# Patient Record
Sex: FEMALE | Race: WHITE | Employment: OTHER | URBAN - METROPOLITAN AREA
[De-identification: names, ages, dates, MRNs, and addresses within clinical notes are randomized per-mention and may not be internally consistent; named-entity substitution may affect disease eponyms.]

---

## 2020-10-28 ENCOUNTER — LAB ENCOUNTER (OUTPATIENT)
Dept: LAB | Age: 65
End: 2020-10-28
Attending: INTERNAL MEDICINE
Payer: MEDICARE

## 2020-10-28 ENCOUNTER — OFFICE VISIT (OUTPATIENT)
Dept: ENDOCRINOLOGY CLINIC | Facility: CLINIC | Age: 65
End: 2020-10-28
Payer: MEDICARE

## 2020-10-28 VITALS — DIASTOLIC BLOOD PRESSURE: 80 MMHG | HEART RATE: 79 BPM | WEIGHT: 193 LBS | SYSTOLIC BLOOD PRESSURE: 124 MMHG

## 2020-10-28 DIAGNOSIS — E55.9 VITAMIN D DEFICIENCY: ICD-10-CM

## 2020-10-28 DIAGNOSIS — E04.1 THYROID NODULE: ICD-10-CM

## 2020-10-28 DIAGNOSIS — E04.1 THYROID NODULE: Primary | ICD-10-CM

## 2020-10-28 PROCEDURE — 82306 VITAMIN D 25 HYDROXY: CPT

## 2020-10-28 PROCEDURE — 84445 ASSAY OF TSI GLOBULIN: CPT

## 2020-10-28 PROCEDURE — 86800 THYROGLOBULIN ANTIBODY: CPT

## 2020-10-28 PROCEDURE — 86376 MICROSOMAL ANTIBODY EACH: CPT

## 2020-10-28 PROCEDURE — 84432 ASSAY OF THYROGLOBULIN: CPT

## 2020-10-28 PROCEDURE — 84443 ASSAY THYROID STIM HORMONE: CPT

## 2020-10-28 PROCEDURE — 99203 OFFICE O/P NEW LOW 30 MIN: CPT | Performed by: INTERNAL MEDICINE

## 2020-10-28 PROCEDURE — 84439 ASSAY OF FREE THYROXINE: CPT

## 2020-10-28 PROCEDURE — 36415 COLL VENOUS BLD VENIPUNCTURE: CPT

## 2020-10-28 PROCEDURE — G0463 HOSPITAL OUTPT CLINIC VISIT: HCPCS | Performed by: INTERNAL MEDICINE

## 2020-10-28 RX ORDER — PREDNISONE 10 MG/1
10 TABLET ORAL
COMMUNITY
Start: 2020-10-13

## 2020-10-28 NOTE — PROGRESS NOTES
Name: Sonny Carpenter  Date: 10/28/2020    Referring Physician: No ref.  provider found    Patient presents with:  Consult: Pt was diagnosed with  Leukocytoclastic vasculitis and was advised by dermatologist to f/u with Endocrinology to complete thyroid rel Oral Tab, Take 20 mg by mouth nightly., Disp: , Rfl:   •  Azelaic Acid (FINACEA) 15 % External Gel, Use bid as directed to face (Patient not taking: Reported on 10/28/2020 ), Disp: 50 g, Rfl: 6     Allergies:     Penicillins             ANAPHYLAXIS  [place] population approx 50-60% of the population  -Discussed risk of malignancy is approx 3-5%, 95-97% of nodules are benign  -Discussed recommendation to perform FNA biopsy of nodules greater than 1cm in size  -Discussed that if nodule is benign then plan to fo

## 2020-11-02 ENCOUNTER — TELEPHONE (OUTPATIENT)
Dept: ENDOCRINOLOGY CLINIC | Facility: CLINIC | Age: 65
End: 2020-11-02

## 2020-11-02 DIAGNOSIS — E55.9 VITAMIN D DEFICIENCY: Primary | ICD-10-CM

## 2020-11-02 RX ORDER — ERGOCALCIFEROL (VITAMIN D2) 1250 MCG
CAPSULE ORAL
Qty: 12 CAPSULE | Refills: 1 | Status: SHIPPED | OUTPATIENT
Start: 2020-11-02 | End: 2021-01-20

## 2020-11-02 NOTE — TELEPHONE ENCOUNTER
rn called patient with message by dr Phill Cottrell patient verbalized understanding and [facility]quest [facility]  Lab ordered

## 2020-11-02 NOTE — TELEPHONE ENCOUNTER
Please call patient - good news, her thyroid antibody levels were all negative. No evidence of autoimmune thyroid disease. She does have significant vitamin D deficiency.   Start Ergocalciferol 50,000 units weekly for 3 months then recheck Vitamin D level

## 2020-11-11 ENCOUNTER — TELEPHONE (OUTPATIENT)
Dept: ENDOCRINOLOGY CLINIC | Facility: CLINIC | Age: 65
End: 2020-11-11

## 2020-11-11 NOTE — TELEPHONE ENCOUNTER
Received US thyroid from [address] [place] [address] - [facility] on provider's desk for review

## 2020-11-12 NOTE — TELEPHONE ENCOUNTER
rn called patient with message by dr Cornelious Halsted , patient verbalized understanding and had no further questions

## 2020-11-12 NOTE — TELEPHONE ENCOUNTER
Please call patient - good news, thyroid ultrasound is stable. Small nodule has not changed therefore no need for further evaluation at this time. Thanks.

## 2020-12-08 ENCOUNTER — PATIENT MESSAGE (OUTPATIENT)
Dept: ENDOCRINOLOGY CLINIC | Facility: CLINIC | Age: 65
End: 2020-12-08

## 2020-12-08 DIAGNOSIS — E55.9 VITAMIN D DEFICIENCY: Primary | ICD-10-CM

## 2020-12-08 DIAGNOSIS — E04.1 THYROID NODULE: ICD-10-CM

## 2020-12-10 NOTE — TELEPHONE ENCOUNTER
From: Barrera Han  To: Jesse Barros MD  Sent: 12/8/2020 12:58 PM CST  Subject: Other    Hi Dr Ana Travis  I am sending you my bone density test. It says maybe I should be taking some medication for my Osteopenia? I am sending you my report if you could review and let me know what you think.    thank you Noe Verduzco

## 2020-12-10 NOTE — TELEPHONE ENCOUNTER
Dr. Koffi Trejo,     Please see patient's msg below. Bone density test attached to [address] [place] [address]. Thank you!

## 2021-01-20 RX ORDER — ERGOCALCIFEROL 1.25 MG/1
CAPSULE ORAL
Qty: 12 CAPSULE | Refills: 1 | Status: SHIPPED | OUTPATIENT
Start: 2021-01-20 | End: 2021-08-17

## 2021-08-09 RX ORDER — ERGOCALCIFEROL 1.25 MG/1
CAPSULE ORAL
Qty: 12 CAPSULE | Refills: 1 | OUTPATIENT
Start: 2021-08-09

## 2021-08-09 NOTE — TELEPHONE ENCOUNTER
RN sent patient [address] requesting her to have vit D lab drawn to assess level before refilling med per below.     Refused med per provider (below)

## 2021-08-09 NOTE — TELEPHONE ENCOUNTER
Will hold off on refill at this time. Last vitamin d level was drawn on 10/2020. Patient needs to have this lab repeated to evaluate need for continued prescription. Looks like Dr. Gayathri Romero has already entered an order for vitamin d level to be drawn.  Ple

## 2022-08-25 NOTE — TELEPHONE ENCOUNTER
lov 10/28/20  Has fu on 12/12/22  Hi Dr Melia Singh  I never did go for my vitamin D blood work last year. I had back surgery and then totally forgot about it. Could I have a script to get my  vitamin D & the complete thyroid panel blood work like I did before?  I made an appointment but you are too good so I could get in until Dec. lol I am on the wait list.

## 2022-09-09 ENCOUNTER — LAB ENCOUNTER (OUTPATIENT)
Dept: LAB | Age: 67
End: 2022-09-09
Attending: INTERNAL MEDICINE
Payer: MEDICARE

## 2022-09-09 DIAGNOSIS — E04.1 THYROID NODULE: ICD-10-CM

## 2022-09-09 LAB
T4 FREE SERPL-MCNC: 1.1 NG/DL (ref 0.8–1.7)
TSI SER-ACNC: 0.62 MIU/ML (ref 0.36–3.74)
VIT D+METAB SERPL-MCNC: 23.6 NG/ML (ref 30–100)

## 2022-09-09 PROCEDURE — 84443 ASSAY THYROID STIM HORMONE: CPT

## 2022-09-09 PROCEDURE — 36415 COLL VENOUS BLD VENIPUNCTURE: CPT

## 2022-09-09 PROCEDURE — 84439 ASSAY OF FREE THYROXINE: CPT

## 2022-09-09 PROCEDURE — 82306 VITAMIN D 25 HYDROXY: CPT | Performed by: INTERNAL MEDICINE

## 2022-12-05 RX ORDER — ERGOCALCIFEROL 1.25 MG/1
CAPSULE ORAL
Qty: 12 CAPSULE | Refills: 1 | Status: SHIPPED | OUTPATIENT
Start: 2022-12-05

## 2022-12-12 ENCOUNTER — OFFICE VISIT (OUTPATIENT)
Dept: ENDOCRINOLOGY CLINIC | Facility: CLINIC | Age: 67
End: 2022-12-12
Payer: MEDICARE

## 2022-12-12 VITALS — WEIGHT: 207 LBS | HEART RATE: 66 BPM | DIASTOLIC BLOOD PRESSURE: 80 MMHG | SYSTOLIC BLOOD PRESSURE: 130 MMHG

## 2022-12-12 DIAGNOSIS — E03.8 HYPOTHYROIDISM DUE TO HASHIMOTO'S THYROIDITIS: Primary | ICD-10-CM

## 2022-12-12 DIAGNOSIS — E55.9 VITAMIN D DEFICIENCY: ICD-10-CM

## 2022-12-12 DIAGNOSIS — E06.3 HYPOTHYROIDISM DUE TO HASHIMOTO'S THYROIDITIS: Primary | ICD-10-CM

## 2022-12-12 PROCEDURE — 99214 OFFICE O/P EST MOD 30 MIN: CPT | Performed by: INTERNAL MEDICINE

## 2022-12-12 RX ORDER — LEVOTHYROXINE SODIUM 0.1 MG/1
TABLET ORAL
Qty: 90 TABLET | Refills: 0 | Status: SHIPPED | OUTPATIENT
Start: 2022-12-12

## 2022-12-12 RX ORDER — PHENTERMINE HYDROCHLORIDE 15 MG/1
15 CAPSULE ORAL EVERY MORNING
Qty: 30 CAPSULE | Refills: 1 | Status: SHIPPED | OUTPATIENT
Start: 2022-12-12

## 2023-01-05 ENCOUNTER — PATIENT MESSAGE (OUTPATIENT)
Dept: ENDOCRINOLOGY CLINIC | Facility: CLINIC | Age: 68
End: 2023-01-05

## 2023-01-06 RX ORDER — PHENTERMINE HYDROCHLORIDE 30 MG/1
30 CAPSULE ORAL EVERY MORNING
Qty: 30 CAPSULE | Refills: 2 | Status: SHIPPED | OUTPATIENT
Start: 2023-01-06

## 2023-04-18 NOTE — TELEPHONE ENCOUNTER
Phentermine HCl 30 MG Oral Cap, Take 1 capsule (30 mg total) by mouth every morning., Disp: 30 capsule, Rfl: 2      Pharmacy Message: The current prescription for your patient has no refills remaining or is about to .  If you want to continue therapy for your patient, as indicated above, please prepare and fax a valid signed written prescription to the pharmacy, or authorize a verbal prescription directly to the pharmacist.

## 2023-04-20 RX ORDER — PHENTERMINE HYDROCHLORIDE 30 MG/1
30 CAPSULE ORAL EVERY MORNING
Qty: 30 CAPSULE | Refills: 2 | Status: SHIPPED | OUTPATIENT
Start: 2023-04-20

## 2023-07-26 ENCOUNTER — PATIENT MESSAGE (OUTPATIENT)
Dept: ENDOCRINOLOGY CLINIC | Facility: CLINIC | Age: 68
End: 2023-07-26

## 2023-07-26 DIAGNOSIS — E04.1 THYROID NODULE: ICD-10-CM

## 2023-07-26 DIAGNOSIS — E88.81 METABOLIC SYNDROME: ICD-10-CM

## 2023-07-26 DIAGNOSIS — R73.01 IMPAIRED FASTING GLUCOSE: ICD-10-CM

## 2023-07-26 DIAGNOSIS — E55.9 VITAMIN D DEFICIENCY: ICD-10-CM

## 2023-07-26 DIAGNOSIS — E03.8 HYPOTHYROIDISM DUE TO HASHIMOTO'S THYROIDITIS: Primary | ICD-10-CM

## 2023-07-26 DIAGNOSIS — E06.3 HYPOTHYROIDISM DUE TO HASHIMOTO'S THYROIDITIS: Primary | ICD-10-CM

## 2023-08-01 ENCOUNTER — LAB ENCOUNTER (OUTPATIENT)
Dept: LAB | Age: 68
End: 2023-08-01
Attending: INTERNAL MEDICINE
Payer: MEDICARE

## 2023-08-01 DIAGNOSIS — E88.81 METABOLIC SYNDROME: ICD-10-CM

## 2023-08-01 DIAGNOSIS — E55.9 VITAMIN D DEFICIENCY: ICD-10-CM

## 2023-08-01 DIAGNOSIS — E03.8 HYPOTHYROIDISM DUE TO HASHIMOTO'S THYROIDITIS: ICD-10-CM

## 2023-08-01 DIAGNOSIS — E06.3 HYPOTHYROIDISM DUE TO HASHIMOTO'S THYROIDITIS: ICD-10-CM

## 2023-08-01 DIAGNOSIS — R73.01 IMPAIRED FASTING GLUCOSE: ICD-10-CM

## 2023-08-01 LAB
ALBUMIN SERPL-MCNC: 3.9 G/DL (ref 3.4–5)
ALBUMIN/GLOB SERPL: 1.2 {RATIO} (ref 1–2)
ALP LIVER SERPL-CCNC: 88 U/L
ALT SERPL-CCNC: 36 U/L
ANION GAP SERPL CALC-SCNC: 4 MMOL/L (ref 0–18)
AST SERPL-CCNC: 17 U/L (ref 15–37)
BILIRUB SERPL-MCNC: 0.5 MG/DL (ref 0.1–2)
BUN BLD-MCNC: 14 MG/DL (ref 7–18)
CALCIUM BLD-MCNC: 9.1 MG/DL (ref 8.5–10.1)
CHLORIDE SERPL-SCNC: 107 MMOL/L (ref 98–112)
CO2 SERPL-SCNC: 30 MMOL/L (ref 21–32)
CREAT BLD-MCNC: 0.93 MG/DL
EGFRCR SERPLBLD CKD-EPI 2021: 67 ML/MIN/1.73M2 (ref 60–?)
FASTING STATUS PATIENT QL REPORTED: NO
GLOBULIN PLAS-MCNC: 3.3 G/DL (ref 2.8–4.4)
GLUCOSE BLD-MCNC: 142 MG/DL (ref 70–99)
OSMOLALITY SERPL CALC.SUM OF ELEC: 295 MOSM/KG (ref 275–295)
POTASSIUM SERPL-SCNC: 3.5 MMOL/L (ref 3.5–5.1)
PROT SERPL-MCNC: 7.2 G/DL (ref 6.4–8.2)
SODIUM SERPL-SCNC: 141 MMOL/L (ref 136–145)
T3FREE SERPL-MCNC: 2.28 PG/ML (ref 2.4–4.2)
T4 FREE SERPL-MCNC: 1.1 NG/DL (ref 0.8–1.7)
TSI SER-ACNC: 1.19 MIU/ML (ref 0.36–3.74)
VIT D+METAB SERPL-MCNC: 68.9 NG/ML (ref 30–100)

## 2023-08-01 PROCEDURE — 84481 FREE ASSAY (FT-3): CPT

## 2023-08-01 PROCEDURE — 84443 ASSAY THYROID STIM HORMONE: CPT

## 2023-08-01 PROCEDURE — 84439 ASSAY OF FREE THYROXINE: CPT

## 2023-08-01 PROCEDURE — 36415 COLL VENOUS BLD VENIPUNCTURE: CPT

## 2023-08-01 PROCEDURE — 82306 VITAMIN D 25 HYDROXY: CPT

## 2023-08-01 PROCEDURE — 80053 COMPREHEN METABOLIC PANEL: CPT

## 2023-08-01 PROCEDURE — 83036 HEMOGLOBIN GLYCOSYLATED A1C: CPT

## 2023-08-02 LAB
EST. AVERAGE GLUCOSE BLD GHB EST-MCNC: 131 MG/DL (ref 68–126)
HBA1C MFR BLD: 6.2 % (ref ?–5.7)

## 2023-08-03 ENCOUNTER — OFFICE VISIT (OUTPATIENT)
Dept: ENDOCRINOLOGY CLINIC | Facility: CLINIC | Age: 68
End: 2023-08-03

## 2023-08-03 VITALS — HEART RATE: 78 BPM | SYSTOLIC BLOOD PRESSURE: 114 MMHG | DIASTOLIC BLOOD PRESSURE: 74 MMHG | WEIGHT: 191 LBS

## 2023-08-03 DIAGNOSIS — E06.3 HYPOTHYROIDISM DUE TO HASHIMOTO'S THYROIDITIS: Primary | ICD-10-CM

## 2023-08-03 DIAGNOSIS — R73.01 IMPAIRED FASTING GLUCOSE: ICD-10-CM

## 2023-08-03 DIAGNOSIS — E03.8 HYPOTHYROIDISM DUE TO HASHIMOTO'S THYROIDITIS: Primary | ICD-10-CM

## 2023-08-03 DIAGNOSIS — E66.9 OBESITY (BMI 30.0-34.9): ICD-10-CM

## 2023-08-03 PROCEDURE — 99214 OFFICE O/P EST MOD 30 MIN: CPT | Performed by: INTERNAL MEDICINE

## 2023-08-03 RX ORDER — SEMAGLUTIDE 0.68 MG/ML
0.5 INJECTION, SOLUTION SUBCUTANEOUS WEEKLY
Qty: 9 ML | Refills: 1 | Status: SHIPPED | OUTPATIENT
Start: 2023-08-03

## 2023-08-03 RX ORDER — LEVOTHYROXINE SODIUM 0.1 MG/1
100 TABLET ORAL
Qty: 90 TABLET | Refills: 3 | Status: SHIPPED | OUTPATIENT
Start: 2023-08-03

## 2023-08-03 NOTE — PROGRESS NOTES
Name: Devorah Noble  Date: 8/3/2023    Referring Physician: No ref. provider found    HISTORY OF PRESENT ILLNESS   Devorah Noble is a 75year old female who presents for Patient presents with:  Hypothyroidism: Patient following up to review thyroid medication/labs. 74 y/o F presents for follow up evaluation of possible thyroid disease. She was diagnosed with vasculitis 5 weeks ago after severe rash developed over her whole body. She does have history of thyroid nodules s/p FNA in 2003 but path was inconclusive. Thyroid US 2013  Small nodule upper left lobe 6mm; R thyroid lobe nodule 9mm    Nodule 2020 demonstrated subcm nodule which didn't require further follow up     She has been maintained on Levothyroxine 100mcg PO daily, taking medication at bedtime. Compression Symptoms:  Dysphagia: No  Dyspnea: No  Anterior Neck Pain: No  Voice Change: No     Niece thyroid cancer   Daughter, maternal h/o hypothyroidism     REVIEW OF SYSTEMS  Eyes: no change in vision  Neurologic: no headache, generalized or focal weakness or numbness.   Head: normal  ENT: normal  Lungs: no shortness of breath, wheezing or BELLE  Cardiovascular:  no chest pain or palpitations  Gastrointestinal:  no abdominal pain, bowel movement problems  Musculoskeletal: no muscle pain or arthralgia  /Gyne: no frequency or discomfort while urinating  Psychiatric:  no acute distress, anxiety  or depression  Skin: normal moisturized skin    Medications:     Current Outpatient Medications:     Phentermine HCl 30 MG Oral Cap, Take 1 capsule (30 mg total) by mouth every morning., Disp: 30 capsule, Rfl: 2    LEVOTHYROXINE 100 MCG Oral Tab, TAKE 1 TABLET(100 MCG) BY MOUTH BEFORE BREAKFAST, Disp: 90 tablet, Rfl: 0    ERGOCALCIFEROL 1.25 MG (28383 UT) Oral Cap, TAKE 1 CAPSULE BY MOUTH WEEKLY, Disp: 12 capsule, Rfl: 1    Valsartan-Hydrochlorothiazide 160-12.5 MG Oral Tab, , Disp: , Rfl: 1    simvastatin (ZOCOR) 20 MG Oral Tab, Take 1 tablet (20 mg total) by mouth nightly., Disp: , Rfl:     Azelaic Acid (FINACEA) 15 % External Gel, Use bid as directed to face, Disp: 50 g, Rfl: 6    Phentermine HCl 15 MG Oral Cap, Take 1 capsule (15 mg total) by mouth every morning., Disp: 30 capsule, Rfl: 1    predniSONE 10 MG Oral Tab, Take 10 mg by mouth., Disp: , Rfl:     Amitriptyline HCl (ELAVIL) 25 MG Oral Tab, , Disp: , Rfl: 1     Allergies:     Penicillins             ANAPHYLAXIS  Robinul [Glycopyrro*    ANAPHYLAXIS    Social History:   Social History    Socioeconomic History      Marital status:     Tobacco Use      Smoking status: Never    Substance and Sexual Activity      Alcohol use: Yes        Alcohol/week: 0.0 standard drinks of alcohol        Comment: rarely    Other Topics      Concerns:        Pt has a pacemaker: No        Pt has a defibrillator: No        Reaction to local anesthetic: No      Medical History:   Past Medical History:   Diagnosis Date    Essential hypertension     Hyperlipidemia     Hypothyroidism        Surgical history:   Past Surgical History:   Procedure Laterality Date    COLECTOMY      noncancerous polyp removed    HYSTERECTOMY      OTHER      history of D and C's    VENTRAL HERNIA REPAIR         PHYSICAL EXAMINATION:  /74   Pulse 78   Wt 191 lb (86.6 kg)     General Appearance:  Alert, in no acute distress, well developed  Eyes: normal conjunctivae, sclera. Ears/Nose/Mouth/Throat/Neck:  normal hearing, normal speech and diffusely enlarged thyroid gland  Neck: Trachea midline  Neurologic: sensory grossly intact and motor grossly intact  Musculoskeletal:  normal muscle strength and tone  PV: normal pulses of carotids, pedals  Skin:  normal moisture and skin texture  Hair & Nails:  normal scalp hair     Neuro:  sensory grossly intact and motor grossly intact  Psychiatric:  oriented to time, self, and place  Nutritional:  no abnormal weight gain or loss    ASSESSMENT/PLAN:    1.  Thyroid Nodules  -Discussed common occurrence of thyroid nodules in the population approx 50-60% of the population  -Discussed risk of malignancy is approx 3-5%, 95-97% of nodules are benign  -Discussed recommendation to perform FNA biopsy of nodules greater than 1cm in size  -Discussed that if nodule is benign then plan to follow with yearly thyroid ultrasound  -Thyroid US demonstrated subcm nodule therefore no need for further evaluation     2. Hypothyroidism  - Continue Levothyroxine  - Normal TFTs     3. Weight gain  - Discussed Low CHO diet  - She has finished 4 months of phentermine with 10lbs of weight gain    4.  Impaired Fasting Glucose  - Discussed diagnosis with patient  - Discussed Low CHO diet  - Start Ozempic 0.5mg subcutaneous weekly, verbalized understanding of risks and benefits     RTC 6 months     8/3/2023  Selene Guzman MD

## 2023-08-09 ENCOUNTER — PATIENT MESSAGE (OUTPATIENT)
Dept: ENDOCRINOLOGY CLINIC | Facility: CLINIC | Age: 68
End: 2023-08-09

## 2023-10-20 ENCOUNTER — PATIENT MESSAGE (OUTPATIENT)
Dept: ENDOCRINOLOGY CLINIC | Facility: CLINIC | Age: 68
End: 2023-10-20

## 2023-10-23 RX ORDER — SEMAGLUTIDE 1.34 MG/ML
1 INJECTION, SOLUTION SUBCUTANEOUS WEEKLY
Qty: 9 ML | Refills: 1 | Status: SHIPPED | OUTPATIENT
Start: 2023-10-23

## 2023-11-28 ENCOUNTER — PATIENT MESSAGE (OUTPATIENT)
Dept: ENDOCRINOLOGY CLINIC | Facility: CLINIC | Age: 68
End: 2023-11-28

## 2023-11-29 RX ORDER — TIRZEPATIDE 7.5 MG/.5ML
7.5 INJECTION, SOLUTION SUBCUTANEOUS WEEKLY
Qty: 2 ML | Refills: 2 | Status: SHIPPED | OUTPATIENT
Start: 2023-11-29

## 2023-11-29 NOTE — TELEPHONE ENCOUNTER
Dr [facility],    Please see additional questions    I did not see any ER labs in [facility] or care everywhere      Component      Latest Ref Rng 8/1/2023   Glucose      70 - 99 mg/dL 142 (H)    Sodium      136 - 145 mmol/L 141    Potassium      3.5 - 5.1 mmol/L 3.5    Chloride      98 - 112 mmol/L 107    Carbon Dioxide, Total      21.0 - 32.0 mmol/L 30.0    ANION GAP      0 - 18 mmol/L 4    BUN      7 - 18 mg/dL 14    CREATININE      0.55 - 1.02 mg/dL 0.93    CALCIUM      8.5 - 10.1 mg/dL 9.1    CALCULATED OSMOLALITY      275 - 295 mOsm/kg 295    EGFR      >=60 mL/min/1.73m2 67    AST (SGOT)      15 - 37 U/L 17    ALT (SGPT)      13 - 56 U/L 36    ALKALINE PHOSPHATASE      55 - 142 U/L 88    Total Bilirubin      0.1 - 2.0 mg/dL 0.5    PROTEIN, TOTAL      6.4 - 8.2 g/dL 7.2    Albumin      3.4 - 5.0 g/dL 3.9    Globulin      2.8 - 4.4 g/dL 3.3    A/G Ratio      1.0 - 2.0  1.2    Patient Fasting for CMP?  No    HEMOGLOBIN A1c      <5.7 % 6.2 (H)    ESTIMATED AVERAGE GLUCOSE      68 - 126 mg/dL 131 (H)    TSH      0.358 - 3.740 mIU/mL 1.190    T3 FREE      2.40 - 4.20 pg/mL 2.28 (L)    T4,Free (Direct)      0.8 - 1.7 ng/dL 1.1    VITAMIN D, 25-OH, TOTAL      30.0 - 100.0 ng/mL 68.9

## 2024-01-18 ENCOUNTER — TELEPHONE (OUTPATIENT)
Dept: ENDOCRINOLOGY CLINIC | Facility: CLINIC | Age: 69
End: 2024-01-18

## 2024-01-18 DIAGNOSIS — E66.9 OBESITY (BMI 30.0-34.9): Primary | ICD-10-CM

## 2024-01-18 RX ORDER — ERGOCALCIFEROL 1.25 MG/1
CAPSULE ORAL
Qty: 12 CAPSULE | Refills: 1 | Status: SHIPPED | OUTPATIENT
Start: 2024-01-18

## 2024-01-18 NOTE — TELEPHONE ENCOUNTER
LOV;08/03/23    RTC;6months    FU;No F/U Mychart mssg sent     Pending Monthly Supply;order pending, approve if appropriate.

## 2024-01-20 NOTE — TELEPHONE ENCOUNTER
Ozempic PA submitted in [employer]. It may get denied due to patient not having type 2 diabetes.

## 2024-01-23 NOTE — TELEPHONE ENCOUNTER
LOV 8/3/2023  No future f/u apts scheduled      Endo staff: please reach out to patient to help schedule apt.     Thank you!

## 2024-01-23 NOTE — TELEPHONE ENCOUNTER
Received denial from [employer] regarding the request for Ozempic.   \"Diagnosis of impaired fasting glucose did not meet the requirements of a medically accepted indication\".    Would you like to appeal decision?

## 2024-01-24 RX ORDER — SEMAGLUTIDE 2.68 MG/ML
2 INJECTION, SOLUTION SUBCUTANEOUS WEEKLY
Qty: 9 ML | Refills: 1 | Status: SHIPPED | OUTPATIENT
Start: 2024-01-24

## 2024-01-25 RX ORDER — PHENTERMINE HYDROCHLORIDE 15 MG/1
15 CAPSULE ORAL EVERY MORNING
Qty: 30 CAPSULE | Refills: 2 | Status: SHIPPED | OUTPATIENT
Start: 2024-01-25

## 2024-01-25 NOTE — TELEPHONE ENCOUNTER
Dr. Mora, please see patients MCM sent on 1/24/24. Patient is asking for alternative or if she should go back on phentermine. Please advise.

## 2024-01-31 ENCOUNTER — OFFICE VISIT (OUTPATIENT)
Dept: ENDOCRINOLOGY CLINIC | Facility: CLINIC | Age: 69
End: 2024-01-31
Payer: MEDICARE

## 2024-01-31 VITALS — SYSTOLIC BLOOD PRESSURE: 130 MMHG | WEIGHT: 182 LBS | DIASTOLIC BLOOD PRESSURE: 84 MMHG | HEART RATE: 78 BPM

## 2024-01-31 DIAGNOSIS — E03.8 HYPOTHYROIDISM DUE TO HASHIMOTO'S THYROIDITIS: ICD-10-CM

## 2024-01-31 DIAGNOSIS — R73.01 IMPAIRED FASTING GLUCOSE: Primary | ICD-10-CM

## 2024-01-31 DIAGNOSIS — E06.3 HYPOTHYROIDISM DUE TO HASHIMOTO'S THYROIDITIS: ICD-10-CM

## 2024-01-31 DIAGNOSIS — E55.9 VITAMIN D DEFICIENCY: ICD-10-CM

## 2024-01-31 LAB
CARTRIDGE LOT#: NORMAL NUMERIC
GLUCOSE BLOOD: 96
HEMOGLOBIN A1C: 5.4 % (ref 4.3–5.6)
TEST STRIP LOT #: NORMAL NUMERIC

## 2024-01-31 PROCEDURE — 99214 OFFICE O/P EST MOD 30 MIN: CPT | Performed by: INTERNAL MEDICINE

## 2024-01-31 PROCEDURE — 82947 ASSAY GLUCOSE BLOOD QUANT: CPT | Performed by: INTERNAL MEDICINE

## 2024-01-31 PROCEDURE — 83036 HEMOGLOBIN GLYCOSYLATED A1C: CPT | Performed by: INTERNAL MEDICINE

## 2024-01-31 RX ORDER — IRBESARTAN AND HYDROCHLOROTHIAZIDE 150; 12.5 MG/1; MG/1
1 TABLET, FILM COATED ORAL DAILY
COMMUNITY
Start: 2023-05-19

## 2024-01-31 NOTE — PROGRESS NOTES
Name: Mary Rodríguez  Date: 1/31/2024    Referring Physician: No ref. provider found    HISTORY OF PRESENT ILLNESS   Mary Rodríguez is a 68 year old female who presents for   Chief Complaint   Patient presents with    Hypothyroidism     Patient following up to check thyroid medication/labs.      69 y/o F presents for follow up evaluation of possible thyroid disease.  She was diagnosed with vasculitis 5 weeks ago after severe rash developed over her whole body.      She does have history of thyroid nodules s/p FNA in 2003 but path was inconclusive.     Thyroid US 2013  Small nodule upper left lobe 6mm; R thyroid lobe nodule 9mm    Nodule 2020 demonstrated subcm nodule which didn't require further follow up     She has been maintained on Levothyroxine 100mcg PO daily, taking medication at bedtime.     Since last visit she was started on Ozempic therapy and tolerated well. She has lost approximately 25 lbs with Ozempic therapy.  However no longer covered by insurance.     HgA1c 5.4% POC Today     Compression Symptoms:  Dysphagia: No  Dyspnea: No  Anterior Neck Pain: No  Voice Change: No     Niece thyroid cancer   Daughter, maternal h/o hypothyroidism     REVIEW OF SYSTEMS  Eyes: no change in vision  Neurologic: no headache, generalized or focal weakness or numbness.  Head: normal  ENT: normal  Lungs: no shortness of breath, wheezing or BELLE  Cardiovascular:  no chest pain or palpitations  Gastrointestinal:  no abdominal pain, bowel movement problems  Musculoskeletal: no muscle pain or arthralgia  /Gyne: no frequency or discomfort while urinating  Psychiatric:  no acute distress, anxiety  or depression  Skin: normal moisturized skin    Medications:     Current Outpatient Medications:     Irbesartan-hydroCHLOROthiazide 150-12.5 MG Oral Tab, Take 1 tablet by mouth daily., Disp: , Rfl:     ergocalciferol 1.25 MG (79675 UT) Oral Cap, TAKE 1 CAPSULE BY MOUTH WEEKLY, Disp: 12 capsule, Rfl: 1    levothyroxine 100 MCG  Oral Tab, Take 1 tablet (100 mcg total) by mouth before breakfast., Disp: 90 tablet, Rfl: 3    Phentermine HCl 15 MG Oral Cap, Take 1 capsule (15 mg total) by mouth every morning. (Patient not taking: Reported on 1/31/2024), Disp: 30 capsule, Rfl: 2    semaglutide (OZEMPIC, 2 MG/DOSE,) 8 MG/3ML Subcutaneous Solution Pen-injector, Inject 2 mg into the skin once a week., Disp: 9 mL, Rfl: 1    predniSONE 10 MG Oral Tab, Take 10 mg by mouth., Disp: , Rfl:     Amitriptyline HCl (ELAVIL) 25 MG Oral Tab, , Disp: , Rfl: 1    Valsartan-Hydrochlorothiazide 160-12.5 MG Oral Tab, , Disp: , Rfl: 1    simvastatin (ZOCOR) 20 MG Oral Tab, Take 1 tablet (20 mg total) by mouth nightly., Disp: , Rfl:     Azelaic Acid (FINACEA) 15 % External Gel, Use bid as directed to face, Disp: 50 g, Rfl: 6     Allergies:   Allergies   Allergen Reactions    Penicillins ANAPHYLAXIS    Robinul [Glycopyrrolate] ANAPHYLAXIS       Social History:   Social History     Socioeconomic History    Marital status:    Tobacco Use    Smoking status: Never   Substance and Sexual Activity    Alcohol use: Yes     Alcohol/week: 0.0 standard drinks of alcohol     Comment: rarely   Other Topics Concern    Pt has a pacemaker No    Pt has a defibrillator No    Reaction to local anesthetic No       Medical History:   Past Medical History:   Diagnosis Date    Essential hypertension     Hyperlipidemia     Hypothyroidism        Surgical history:   Past Surgical History:   Procedure Laterality Date    COLECTOMY      noncancerous polyp removed    HYSTERECTOMY      OTHER      history of D and C's    VENTRAL HERNIA REPAIR         PHYSICAL EXAMINATION:  /84   Pulse 78   Wt 182 lb (82.6 kg)     General Appearance:  Alert, in no acute distress, well developed  Eyes: normal conjunctivae, sclera.  Ears/Nose/Mouth/Throat/Neck:  normal hearing, normal speech and diffusely enlarged thyroid gland  Neck: Trachea midline  Neurologic: sensory grossly intact and motor grossly  intact  Musculoskeletal:  normal muscle strength and tone  PV: normal pulses of carotids, pedals  Skin:  normal moisture and skin texture  Hair & Nails:  normal scalp hair     Neuro:  sensory grossly intact and motor grossly intact  Psychiatric:  oriented to time, self, and place  Nutritional:  no abnormal weight gain or loss    ASSESSMENT/PLAN:    1. Thyroid Nodules  -Discussed common occurrence of thyroid nodules in the population approx 50-60% of the population  -Discussed risk of malignancy is approx 3-5%, 95-97% of nodules are benign  -Discussed recommendation to perform FNA biopsy of nodules greater than 1cm in size  -Discussed that if nodule is benign then plan to follow with yearly thyroid ultrasound  -Thyroid US demonstrated subcm nodule therefore no need for further evaluation     2. Hypothyroidism  - Continue Levothyroxine  - Normal TFTs     3. Weight gain  - Discussed Low CHO diet  - She has finished 4 months of phentermine with 10lbs of weight gain    4. Impaired Fasting Glucose  - Discussed diagnosis with patient  - Discussed Low CHO diet  - Ozempic no longer covered by insurance  - Start phentermine 15mg PO daily, verbalized understanding of risks and benefits     RTC 6 months     1/31/2024  Merced Mora MD

## 2024-02-10 ENCOUNTER — TELEPHONE (OUTPATIENT)
Dept: ENDOCRINOLOGY CLINIC | Facility: CLINIC | Age: 69
End: 2024-02-10

## 2024-03-21 ENCOUNTER — PATIENT MESSAGE (OUTPATIENT)
Dept: ENDOCRINOLOGY CLINIC | Facility: CLINIC | Age: 69
End: 2024-03-21

## 2024-03-22 RX ORDER — SEMAGLUTIDE 0.25 MG/.5ML
0.25 INJECTION, SOLUTION SUBCUTANEOUS WEEKLY
Qty: 4 EACH | Refills: 0 | Status: SHIPPED | OUTPATIENT
Start: 2024-03-22

## 2024-03-22 NOTE — TELEPHONE ENCOUNTER
From: Mary Rodríguez  To: Merced Mora  Sent: 3/21/2024 6:18 PM CDT  Subject: Wygovy    Hi Dr Mora  I really don’t think the 15 mg phentermine really does much for me . I’m almost ready to just pay for my ozempic medicine out of pocket. I just heard on the news that Medicare is approving wygovy for patients with risk of heart attacks, stroke or vascular issues. I have high blood pressure, high cholesterol and recently had to go to a cardiologist because of moderate stenosis of the proximal to mid LAD.left main artery has Distal left main has very small eccentric calcified plaque. Lad Artery - proximal to mid Lad has mixed calcification & noncalcified plaques with moderate stenosis. Dr Nii Morales was happy I was on Ozempic to loose weight, but I stopped because Insurance wasn’t paying for it.   Do you think there’s a chance I could get approved now for the Wygovy and how does it compare to Ozempic?  I’m hoping now that Medicare approved this medicine that Ozempic may be approved in the near future.   My  went to that weight loss clinic and is on what they say is Ozempic but not sure it’s real. lol he wants me to go too, but I rather not.  Thank you and let me know what I should do.  Thank you! Mary Rodríguez

## 2024-04-21 ENCOUNTER — PATIENT MESSAGE (OUTPATIENT)
Dept: ENDOCRINOLOGY CLINIC | Facility: CLINIC | Age: 69
End: 2024-04-21

## 2024-04-21 DIAGNOSIS — E66.9 OBESITY (BMI 30.0-34.9): Primary | ICD-10-CM

## 2024-04-24 RX ORDER — PHENTERMINE HYDROCHLORIDE 37.5 MG/1
37.5 TABLET ORAL
Qty: 30 TABLET | Refills: 2 | Status: SHIPPED | OUTPATIENT
Start: 2024-04-24

## 2024-04-24 NOTE — TELEPHONE ENCOUNTER
From: Mary Rodríguez  Sent: 4/21/2024 2:26 PM CDT  To: Yadira Stallings Clinical Staff  Subject: Eliu Polanco spelled it all wrong wegovy lol

## 2024-04-24 NOTE — TELEPHONE ENCOUNTER
Dr. Mora, please see message. She is asking to increase phentermine.     Wegovy PA submitted in [employer].

## 2024-04-25 NOTE — TELEPHONE ENCOUNTER
Received fax from [employer] dated on 04/24/24 stating the WEGOVY has been denied due to it must be used for a medically-accepted, deny because the information provided by the prescriber (diagnosis of impaired fasting glucose did not meet the requirements of medically-accepted indication.  Denial placed in folder  MEM#[id number]

## 2024-04-29 NOTE — TELEPHONE ENCOUNTER
Dr. Mora, we can try submitting with obesity code. Can you add this diagnosis code to your last office note?    Per [employer] fax, \"impaired fasting glucose does not meet requirements\"

## 2024-08-30 DIAGNOSIS — E66.9 OBESITY (BMI 30.0-34.9): ICD-10-CM

## 2024-08-30 RX ORDER — PHENTERMINE HYDROCHLORIDE 37.5 MG/1
37.5 TABLET ORAL
Qty: 30 TABLET | Refills: 2 | Status: SHIPPED | OUTPATIENT
Start: 2024-08-30

## 2024-08-30 NOTE — TELEPHONE ENCOUNTER
Endocrine Refill protocol for oral medications    Protocol Criteria:  FAILED Reason: Appt not within time frame    -Appointment with Endocrinology completed in the last 6 months or scheduled in the next 3 months     Verify the above has been completed or scheduled in the appropriate timeline. If so can send a 90 day supply with 1 refill.     Last completed office visit: 1/31/2024 Merced Mora MD   Next scheduled Follow up:   Future Appointments   Date Time Provider Department Center   1/15/2025 11:15 AM Merced Mora MD [facility]

## 2024-10-29 RX ORDER — LEVOTHYROXINE SODIUM 100 UG/1
100 TABLET ORAL
Qty: 90 TABLET | Refills: 3 | Status: SHIPPED | OUTPATIENT
Start: 2024-10-29

## 2024-10-29 NOTE — TELEPHONE ENCOUNTER
Endocrine refill protocol for medications for hypothyroidism and hyperthyroidism    Protocol Criteria:  PASSED Reason: N/A    If all below requirements are met, send a 90-day supply with 1 refill per provider protocol.    Verify appointment with Endocrinology completed in the last 12 months or scheduled in the next 6 months.    Normal TSH result in the past 12 months   Review recent telephone encounters and mychart communications with patient to ensure a dose change has not occurred since last office visit that was not updated in the medication history list     Last completed office visit:1/31/2024 Merced Mora MD   Next scheduled Follow up:   Future Appointments   Date Time Provider Department Center   1/15/2025 11:15 AM Merced Mora MD [facility]      Last TSH result:   TSH   Date Value Ref Range Status   08/01/2023 1.190 0.358 - 3.740 mIU/mL Final     Comment:     This test may exhibit interference when a sample is collected from a person who is consuming high dose of biotin (a.k.a., vitamin B7, vitamin H, coenzyme R) supplements resulting in serum concentrations >100 ng/mL.  Intake of the recommended daily allowance (RDA) for biotin (0.03 mg) has not been shown to typically cause significant interference; however, high dose daily dietary supplements may contain biotin concentrations greater than 150 times (5-10 mg) the RDA.  It is recommended that physicians ask all patients who may be on biotin supplementation to stop biotin consumption at least 72 hours prior to collection of a new sample.

## 2024-12-15 DIAGNOSIS — E66.811 OBESITY (BMI 30.0-34.9): ICD-10-CM

## 2024-12-16 RX ORDER — PHENTERMINE HYDROCHLORIDE 37.5 MG/1
37.5 TABLET ORAL
Qty: 30 TABLET | Refills: 0 | Status: SHIPPED | OUTPATIENT
Start: 2024-12-16

## 2024-12-16 NOTE — TELEPHONE ENCOUNTER
Endocrine Refill protocol for oral medications    Protocol Criteria:  PASSED  Reason: N/A    If below requirement is met, send a 90-day supply with 1 refill per provider protocol.    Verify appointment with Endocrinology completed in the last 6 months or scheduled in the next 3 months.    Last completed office visit: 1/31/2024 Merced Mora MD   Next scheduled Follow up:   Future Appointments   Date Time Provider Department Center   1/15/2025 11:15 AM Merced Mora MD [facility]

## 2025-01-11 ENCOUNTER — PATIENT MESSAGE (OUTPATIENT)
Dept: ENDOCRINOLOGY CLINIC | Facility: CLINIC | Age: 70
End: 2025-01-11

## 2025-01-14 NOTE — TELEPHONE ENCOUNTER
Reviewed attachment.   Advised pt she can just go to the lab and tell them to draw just A1C and vitamin D as the other were already done.  Otherwise, can wait on Wednesday to get them done since there's a lab in the building.

## 2025-01-15 ENCOUNTER — LAB ENCOUNTER (OUTPATIENT)
Dept: LAB | Age: 70
End: 2025-01-15
Attending: INTERNAL MEDICINE
Payer: MEDICARE

## 2025-01-15 ENCOUNTER — OFFICE VISIT (OUTPATIENT)
Dept: ENDOCRINOLOGY CLINIC | Facility: CLINIC | Age: 70
End: 2025-01-15
Payer: MEDICARE

## 2025-01-15 VITALS — WEIGHT: 187 LBS | HEART RATE: 89 BPM | DIASTOLIC BLOOD PRESSURE: 81 MMHG | SYSTOLIC BLOOD PRESSURE: 127 MMHG

## 2025-01-15 DIAGNOSIS — E55.9 VITAMIN D DEFICIENCY: ICD-10-CM

## 2025-01-15 DIAGNOSIS — R73.01 IMPAIRED FASTING GLUCOSE: Primary | ICD-10-CM

## 2025-01-15 LAB
GLUCOSE BLOOD: 134
HEMOGLOBIN A1C: 5.3 % (ref 4.3–5.6)
TEST STRIP LOT #: NORMAL NUMERIC
VIT D+METAB SERPL-MCNC: 57.3 NG/ML (ref 30–100)

## 2025-01-15 PROCEDURE — 82947 ASSAY GLUCOSE BLOOD QUANT: CPT | Performed by: INTERNAL MEDICINE

## 2025-01-15 PROCEDURE — 83036 HEMOGLOBIN GLYCOSYLATED A1C: CPT | Performed by: INTERNAL MEDICINE

## 2025-01-15 PROCEDURE — 36415 COLL VENOUS BLD VENIPUNCTURE: CPT

## 2025-01-15 PROCEDURE — 82306 VITAMIN D 25 HYDROXY: CPT

## 2025-01-15 PROCEDURE — 99213 OFFICE O/P EST LOW 20 MIN: CPT | Performed by: INTERNAL MEDICINE

## 2025-01-15 NOTE — PROGRESS NOTES
Name: Mary Rodríguez  Date: 1/15/2025    Referring Physician: No ref. provider found    HISTORY OF PRESENT ILLNESS   Mary Rodríguez is a 69 year old female who presents for   Chief Complaint   Patient presents with    Hypothyroidism     70 y/o F presents for follow up evaluation of possible thyroid disease.  She was diagnosed with vasculitis 5 weeks ago after severe rash developed over her whole body.      She does have history of thyroid nodules s/p FNA in 2003 but path was inconclusive.     Thyroid US 2013  Small nodule upper left lobe 6mm; R thyroid lobe nodule 9mm    Nodule 2020 demonstrated subcm nodule which didn't require further follow up     She has been maintained on Levothyroxine 100mcg PO daily, taking medication at bedtime.     Attempted coverage for Wegovy but denied.  She has been on phentermine therapy but weight stable so will discontinue.     HgA1c 5.4% 1/2024; 5.3% POC Today     Compression Symptoms:  Dysphagia: No  Dyspnea: No  Anterior Neck Pain: No  Voice Change: No     Niece thyroid cancer   Daughter, maternal h/o hypothyroidism     REVIEW OF SYSTEMS  Eyes: no change in vision  Neurologic: no headache, generalized or focal weakness or numbness.  Head: normal  ENT: normal  Lungs: no shortness of breath, wheezing or BELLE  Cardiovascular:  no chest pain or palpitations  Gastrointestinal:  no abdominal pain, bowel movement problems  Musculoskeletal: no muscle pain or arthralgia  /Gyne: no frequency or discomfort while urinating  Psychiatric:  no acute distress, anxiety  or depression  Skin: normal moisturized skin    Medications:     Current Outpatient Medications:     PHENTERMINE HCL 37.5 MG Oral Tab, TAKE 1 TABLET(37.5 MG) BY MOUTH EVERY MORNING BEFORE BREAKFAST, Disp: 30 tablet, Rfl: 0    LEVOTHYROXINE 100 MCG Oral Tab, TAKE 1 TABLET(100 MCG) BY MOUTH BEFORE BREAKFAST, Disp: 90 tablet, Rfl: 3    Irbesartan-hydroCHLOROthiazide 150-12.5 MG Oral Tab, Take 1 tablet by mouth daily., Disp: ,  Rfl:     ergocalciferol 1.25 MG (54116 UT) Oral Cap, TAKE 1 CAPSULE BY MOUTH WEEKLY, Disp: 12 capsule, Rfl: 1    semaglutide-weight management (WEGOVY) 0.25 MG/0.5ML Subcutaneous Solution Auto-injector, Inject 0.5 mL (0.25 mg total) into the skin once a week., Disp: 4 each, Rfl: 0    Phentermine HCl 15 MG Oral Cap, Take 1 capsule (15 mg total) by mouth every morning. (Patient not taking: Reported on 1/31/2024), Disp: 30 capsule, Rfl: 2    predniSONE 10 MG Oral Tab, Take 10 mg by mouth., Disp: , Rfl:     Amitriptyline HCl (ELAVIL) 25 MG Oral Tab, , Disp: , Rfl: 1    Valsartan-Hydrochlorothiazide 160-12.5 MG Oral Tab, , Disp: , Rfl: 1    simvastatin (ZOCOR) 20 MG Oral Tab, Take 1 tablet (20 mg total) by mouth nightly., Disp: , Rfl:     Azelaic Acid (FINACEA) 15 % External Gel, Use bid as directed to face, Disp: 50 g, Rfl: 6     Allergies:   Allergies   Allergen Reactions    Penicillins ANAPHYLAXIS    Robinul [Glycopyrrolate] ANAPHYLAXIS       Social History:   Social History     Socioeconomic History    Marital status:    Tobacco Use    Smoking status: Never   Substance and Sexual Activity    Alcohol use: Yes     Alcohol/week: 0.0 standard drinks of alcohol     Comment: rarely   Other Topics Concern    Pt has a pacemaker No    Pt has a defibrillator No    Reaction to local anesthetic No       Medical History:   Past Medical History:    Essential hypertension    Hyperlipidemia    Hypothyroidism       Surgical history:   Past Surgical History:   Procedure Laterality Date    Colectomy      noncancerous polyp removed    Hysterectomy      Other      history of D and C's    Ventral hernia repair         PHYSICAL EXAMINATION:  /81   Pulse 89   Wt 187 lb (84.8 kg)     General Appearance:  Alert, in no acute distress, well developed  Eyes: normal conjunctivae, sclera.  Ears/Nose/Mouth/Throat/Neck:  normal hearing, normal speech and diffusely enlarged thyroid gland  Neck: Trachea midline  Neurologic: sensory  grossly intact and motor grossly intact  Musculoskeletal:  normal muscle strength and tone  PV: normal pulses of carotids, pedals  Skin:  normal moisture and skin texture  Hair & Nails:  normal scalp hair     Neuro:  sensory grossly intact and motor grossly intact  Psychiatric:  oriented to time, self, and place  Nutritional:  no abnormal weight gain or loss    ASSESSMENT/PLAN:    1. Thyroid Nodules  -Discussed common occurrence of thyroid nodules in the population approx 50-60% of the population  -Discussed risk of malignancy is approx 3-5%, 95-97% of nodules are benign  -Discussed recommendation to perform FNA biopsy of nodules greater than 1cm in size  -Discussed that if nodule is benign then plan to follow with yearly thyroid ultrasound  -Thyroid US demonstrated subcm nodule therefore no need for further evaluation     2. Hypothyroidism  - Continue Levothyroxine  - Normal TFTs     3. Impaired Fasting Glucose  - Discussed diagnosis with patient  - Discussed Low CHO diet  - GLP-1 was not covered   - Discontinue Phentermine since weight stable       RTC 1 year     1/15/2025  Merced Mora MD

## 2025-03-06 ENCOUNTER — PATIENT MESSAGE (OUTPATIENT)
Dept: ENDOCRINOLOGY CLINIC | Facility: CLINIC | Age: 70
End: 2025-03-06

## 2025-03-07 RX ORDER — TIRZEPATIDE 2.5 MG/.5ML
2.5 INJECTION, SOLUTION SUBCUTANEOUS WEEKLY
Qty: 2 ML | Refills: 0 | Status: SHIPPED | OUTPATIENT
Start: 2025-03-07

## 2025-03-07 NOTE — TELEPHONE ENCOUNTER
Yes, we can send Zepbound but I can't find pharmacy in computer.  I thought it was [facility]?

## 2025-03-14 ENCOUNTER — PATIENT MESSAGE (OUTPATIENT)
Dept: ENDOCRINOLOGY CLINIC | Facility: CLINIC | Age: 70
End: 2025-03-14

## 2025-04-02 ENCOUNTER — PATIENT MESSAGE (OUTPATIENT)
Dept: ENDOCRINOLOGY CLINIC | Facility: CLINIC | Age: 70
End: 2025-04-02

## 2025-04-02 DIAGNOSIS — E66.811 OBESITY (BMI 30.0-34.9): ICD-10-CM

## 2025-04-03 RX ORDER — TIRZEPATIDE 5 MG/.5ML
5 INJECTION, SOLUTION SUBCUTANEOUS WEEKLY
Qty: 6 ML | Refills: 0 | Status: SHIPPED | OUTPATIENT
Start: 2025-04-03

## 2025-04-03 RX ORDER — TIRZEPATIDE 5 MG/.5ML
5 INJECTION, SOLUTION SUBCUTANEOUS WEEKLY
Qty: 2 ML | Refills: 0 | Status: CANCELLED
Start: 2025-04-03

## 2025-04-04 RX ORDER — TIRZEPATIDE 2.5 MG/.5ML
INJECTION, SOLUTION SUBCUTANEOUS
Qty: 2 ML | Refills: 0 | OUTPATIENT
Start: 2025-04-04

## 2025-05-01 ENCOUNTER — PATIENT MESSAGE (OUTPATIENT)
Dept: ENDOCRINOLOGY CLINIC | Facility: CLINIC | Age: 70
End: 2025-05-01

## 2025-05-02 RX ORDER — TIRZEPATIDE 7.5 MG/.5ML
7.5 INJECTION, SOLUTION SUBCUTANEOUS WEEKLY
Qty: 6 ML | Refills: 0 | Status: SHIPPED | OUTPATIENT
Start: 2025-05-02 | End: 2025-05-02

## 2025-05-02 RX ORDER — TIRZEPATIDE 7.5 MG/.5ML
7.5 INJECTION, SOLUTION SUBCUTANEOUS WEEKLY
Qty: 6 ML | Refills: 0 | Status: SHIPPED | OUTPATIENT
Start: 2025-05-02

## 2025-05-02 NOTE — TELEPHONE ENCOUNTER
Pt is finishing 5mg. Pt requesting increase to 7.5mg. See mychart. Please approve if appropriate.       Endocrine Refill protocol for weight management    Protocol Criteria:  PASSED Reason: N/A    If below requirement is met, send a 90-day supply with 1 refill per provider protocol.    Verify appointment with Endocrinology completed in the last 6 months or scheduled in the next 3 months.    Last completed office visit:1/15/2025 Merced Mora MD   Next scheduled Follow up: RTC 1 year

## 2025-05-28 ENCOUNTER — PATIENT MESSAGE (OUTPATIENT)
Dept: ENDOCRINOLOGY CLINIC | Facility: CLINIC | Age: 70
End: 2025-05-28

## 2025-05-28 DIAGNOSIS — E66.811 OBESITY (BMI 30.0-34.9): Primary | ICD-10-CM

## 2025-05-28 RX ORDER — TIRZEPATIDE 10 MG/.5ML
10 INJECTION, SOLUTION SUBCUTANEOUS WEEKLY
Qty: 6 ML | Refills: 0 | Status: SHIPPED | OUTPATIENT
Start: 2025-05-28

## 2025-05-28 NOTE — TELEPHONE ENCOUNTER
Morgan Deleon,   Pt asking if we can send for zepbound 10mg? See [employer] message below, RX pended

## 2025-08-04 DIAGNOSIS — E66.811 OBESITY (BMI 30.0-34.9): ICD-10-CM

## 2025-08-04 RX ORDER — TIRZEPATIDE 10 MG/.5ML
INJECTION, SOLUTION SUBCUTANEOUS
Qty: 6 ML | Refills: 0 | Status: SHIPPED | OUTPATIENT
Start: 2025-08-04

## 2025-08-25 ENCOUNTER — PATIENT MESSAGE (OUTPATIENT)
Dept: ENDOCRINOLOGY CLINIC | Facility: CLINIC | Age: 70
End: 2025-08-25

## 2025-08-25 RX ORDER — TIRZEPATIDE 12.5 MG/.5ML
12.5 INJECTION, SOLUTION SUBCUTANEOUS WEEKLY
Qty: 6 ML | Refills: 0 | Status: SHIPPED | OUTPATIENT
Start: 2025-08-25

## (undated) NOTE — LETTER
3/11/2021              April Garner         Dear Vanesa [place] records indicate that the tests ordered for you by Sonali Bey MD  have not been done.   If you have, in fact, already completed the tests